# Patient Record
Sex: MALE | Race: WHITE
[De-identification: names, ages, dates, MRNs, and addresses within clinical notes are randomized per-mention and may not be internally consistent; named-entity substitution may affect disease eponyms.]

---

## 2024-09-24 ENCOUNTER — APPOINTMENT (OUTPATIENT)
Dept: ORTHOPEDIC SURGERY | Facility: CLINIC | Age: 39
End: 2024-09-24
Payer: COMMERCIAL

## 2024-09-24 DIAGNOSIS — S63.501A UNSPECIFIED SPRAIN OF RIGHT WRIST, INITIAL ENCOUNTER: ICD-10-CM

## 2024-09-24 PROCEDURE — 73110 X-RAY EXAM OF WRIST: CPT | Mod: RT

## 2024-09-24 PROCEDURE — 99203 OFFICE O/P NEW LOW 30 MIN: CPT

## 2024-09-24 NOTE — HISTORY OF PRESENT ILLNESS
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 He was holding a drill with both hands, drilling into rock, he let go with the LEFT hand, and the RIGHT hand twisted. Pain only occurs with activities like holding a drill, mashing potatoes, pushing a door closed, turning key. Not currently taking any medication for this.  Hx: none. [FreeTextEntry5] : BRANDIEROSALVA regalado [RHD] 38 year old male is here today c/o RIGHT wrist pain x 1 month. states he was drilling into a rock while holding drill with both hands. he let go with left hand and right hand was twisted. tried ice. denies prior injury to wrist.

## 2024-09-24 NOTE — IMAGING
[de-identified] : RIGHT HAND skin intact. minimal swelling of dorsal radial wrist. no TTP. wrist ROM: good extension, flexion. good pronation, supination. good EPL, FPL. fingers good extension, flex to full fist. good finger abduction and adduction. SILT to median, ulnar, radial distribution. palpable radial pulse, brisk cap refill all digits. no triggering.   + mild dorsal wrist pain at end extension. no pain with wrist flexion. no pain with pronation, supination. no pain with radial and ulnar deviation. Whalen's maneuver => no pain. no instability. DRUJ shear => no pain. no instability.  thumb MPJ stress: - RCL: no pain, no instability. - UCL: no pain, no instability at neutral and mild flexion.   XRAYS OF RIGHT WRIST (3 views - PA, OBLIQUE, AND LATERAL VIEWS): no acute displaced fracture or dislocation.

## 2024-09-24 NOTE — IMAGING
[de-identified] : RIGHT HAND skin intact. minimal swelling of dorsal radial wrist. no TTP. wrist ROM: good extension, flexion. good pronation, supination. good EPL, FPL. fingers good extension, flex to full fist. good finger abduction and adduction. SILT to median, ulnar, radial distribution. palpable radial pulse, brisk cap refill all digits. no triggering.   + mild dorsal wrist pain at end extension. no pain with wrist flexion. no pain with pronation, supination. no pain with radial and ulnar deviation. Whalen's maneuver => no pain. no instability. DRUJ shear => no pain. no instability.  thumb MPJ stress: - RCL: no pain, no instability. - UCL: no pain, no instability at neutral and mild flexion.   XRAYS OF RIGHT WRIST (3 views - PA, OBLIQUE, AND LATERAL VIEWS): no acute displaced fracture or dislocation.

## 2024-09-24 NOTE — ASSESSMENT
[FreeTextEntry1] : The condition was explained to the patient. - recommend wrist brace, full time except hygiene x 4 weeks. - recommend Aleve BID x 2 weeks. Reviewed contra-indicated medical conditions (eg liver disease, kidney disease, or GI ulcer/bleeding) or medications (eg blood thinners). Discussed possible GI and blood pressure side effects. - pain guided activity modification.  F/u in 4 weeks if persistent pain.

## 2024-09-24 NOTE — HISTORY OF PRESENT ILLNESS
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 He was holding a drill with both hands, drilling into rock, he let go with the LEFT hand, and the RIGHT hand twisted. Pain only occurs with activities like holding a drill, mashing potatoes, pushing a door closed, turning key. Not currently taking any medication for this.  Hx: none. [FreeTextEntry5] : BRANDIEROSALVA regalado [RHD] 38 year old male is here today c/o RIGHT wrist pain x 1 month. states he was drilling into a rock while holding drill with both hands. he let go with left hand and right hand was twisted. tried ice. denies prior injury to wrist.